# Patient Record
Sex: FEMALE | Race: WHITE | NOT HISPANIC OR LATINO | URBAN - METROPOLITAN AREA
[De-identification: names, ages, dates, MRNs, and addresses within clinical notes are randomized per-mention and may not be internally consistent; named-entity substitution may affect disease eponyms.]

---

## 2017-03-23 ENCOUNTER — IMPORTED ENCOUNTER (OUTPATIENT)
Dept: URBAN - METROPOLITAN AREA CLINIC 31 | Facility: CLINIC | Age: 52
End: 2017-03-23

## 2017-03-23 PROBLEM — D31.32: Noted: 2017-03-23

## 2017-03-23 PROCEDURE — 92014 COMPRE OPH EXAM EST PT 1/>: CPT

## 2017-03-23 PROCEDURE — 92250 FUNDUS PHOTOGRAPHY W/I&R: CPT

## 2017-03-23 PROCEDURE — 92015 DETERMINE REFRACTIVE STATE: CPT

## 2017-03-23 NOTE — PATIENT DISCUSSION
1.  Refractive error Change glasses. 2.  Nevus OS: Choroidal lesion meets the criteria specified by the COMS study for a benign lesion. Advise continued observation. Discussed in detail with the patient. The patient voiced understanding.

## 2018-04-05 ENCOUNTER — IMPORTED ENCOUNTER (OUTPATIENT)
Dept: URBAN - METROPOLITAN AREA CLINIC 31 | Facility: CLINIC | Age: 53
End: 2018-04-05

## 2018-04-05 PROBLEM — D31.32: Noted: 2018-04-05

## 2018-04-05 PROCEDURE — 92015 DETERMINE REFRACTIVE STATE: CPT

## 2018-04-05 PROCEDURE — 92014 COMPRE OPH EXAM EST PT 1/>: CPT

## 2019-08-09 NOTE — PATIENT DISCUSSION
GLAUCOMA BOTH EYES:  IOP is too high. Continue with simbrinza one drop ou bid, Timolol  0.5% one drop ou am and suppertime. Add Travatan Z one drop ou hs.

## 2019-09-13 NOTE — PATIENT DISCUSSION
on Travatan Z , timolol, and simbrinza. When rhopressa was added, the IOP came down to OD 13  OS  11.

## 2019-09-13 NOTE — PATIENT DISCUSSION
Pt is tolerating the glaucoma drops well. Continue with Travatan Z one drop ou hs, Timolol  0.5% one drop ou am and suppertime, simbrinza one drop ou bid, and rhopressa one drop ou hs.

## 2020-01-10 NOTE — PATIENT DISCUSSION
MILD GLAUCOMA BOTH EYES:  Pt's IOP is OD 10 and OS 11 on Travatan Z one drop ou hd, Timolol  0.5% one drop ou am and supper time, and simbrinza one drop ou bid. Pt has a 0.8 cup OD and a 0.9 cup OS with a superior and inferior arcuate scotoma OD and a superior arcuate scotoma OS.

## 2020-05-12 NOTE — PATIENT DISCUSSION
[facility] Visual Field 36 point screen: I have reviewed the visual fields both taped and untaped on this patient which demonstrate significant obstruction of the patient's peripheral visual field on the left eye.

## 2020-05-13 ENCOUNTER — IMPORTED ENCOUNTER (OUTPATIENT)
Dept: URBAN - METROPOLITAN AREA CLINIC 31 | Facility: CLINIC | Age: 55
End: 2020-05-13

## 2020-05-13 PROBLEM — D31.32: Noted: 2020-05-13

## 2020-05-13 PROCEDURE — 92014 COMPRE OPH EXAM EST PT 1/>: CPT

## 2020-05-13 PROCEDURE — 92250 FUNDUS PHOTOGRAPHY W/I&R: CPT

## 2020-05-13 PROCEDURE — 92015 DETERMINE REFRACTIVE STATE: CPT

## 2020-05-13 NOTE — PATIENT DISCUSSION
1.  Nevus OS: Choroidal lesion meets the criteria specified by the COMS study for a benign lesion. Advise continued observation. Discussed in detail with the patient. The patient voiced understanding. 2.  Refractive error  Rx glasses. 3.   Astigmatism Annual

## 2020-06-19 NOTE — PATIENT DISCUSSION
Also, please do not hesitate to call us if you have any concerns not addressed by this information. Please call [phone] and we will do everything we can to help you during this period.

## 2021-10-28 NOTE — PATIENT DISCUSSION
IOP at goal. Continue all 3 drops for now. Discussed Adan Turcios again today. Patient considering it. Follow up in 6 months for DFE, OCT.

## 2021-11-12 ENCOUNTER — IMPORTED ENCOUNTER (OUTPATIENT)
Dept: URBAN - METROPOLITAN AREA CLINIC 31 | Facility: CLINIC | Age: 56
End: 2021-11-12

## 2021-11-12 PROBLEM — H01.116: Noted: 2021-11-12

## 2021-11-12 PROCEDURE — 99213 OFFICE O/P EST LOW 20 MIN: CPT

## 2021-11-12 NOTE — PATIENT DISCUSSION
Contact Dermatitis OS - The left eyelids are swollen secondary to contact with an allergen. Rx FML ointment BID

## 2022-04-02 ASSESSMENT — VISUAL ACUITY
OS_SC: 20/20-2
OD_CC: J714''
OD_SC: 20/25-1
OS_CC: J514''
OS_SC: 20/30
OS_SC: 20/25-1
OD_SC: 20/25
OD_SC: 20/25-2
OS_SC: 20/30
OD_SC: 20/40
OD_PH: CC 20/25

## 2022-04-02 ASSESSMENT — TONOMETRY
OS_IOP_MMHG: 13
OS_IOP_MMHG: 17
OD_IOP_MMHG: 14
OD_IOP_MMHG: 13
OS_IOP_MMHG: 14
OD_IOP_MMHG: 16

## 2022-07-09 ENCOUNTER — TELEPHONE ENCOUNTER (OUTPATIENT)
Dept: URBAN - METROPOLITAN AREA CLINIC 121 | Facility: CLINIC | Age: 57
End: 2022-07-09

## 2022-07-10 ENCOUNTER — TELEPHONE ENCOUNTER (OUTPATIENT)
Dept: URBAN - METROPOLITAN AREA CLINIC 121 | Facility: CLINIC | Age: 57
End: 2022-07-10

## 2022-07-30 ENCOUNTER — TELEPHONE ENCOUNTER (OUTPATIENT)
Age: 57
End: 2022-07-30

## 2022-07-31 ENCOUNTER — TELEPHONE ENCOUNTER (OUTPATIENT)
Age: 57
End: 2022-07-31

## 2022-08-04 NOTE — PATIENT DISCUSSION
IOP at goal. Continue all 3 drops for now. Occasionally forgets the Travaprost, will move up the time timing to coincide with other drops.  Discussed Renaldo Cortezught again today. Patient considering it.

## 2022-12-08 NOTE — PATIENT DISCUSSION
Switching from [place] Panorama to Dorzolamide BID OU and Brimonidine BID OU due to cost. Continue Timolol BID. Continue Travoprost qHS.

## 2023-11-16 ENCOUNTER — EMERGENCY VISIT (OUTPATIENT)
Dept: URBAN - METROPOLITAN AREA CLINIC 31 | Facility: CLINIC | Age: 58
End: 2023-11-16

## 2023-11-16 DIAGNOSIS — H01.026: ICD-10-CM

## 2023-11-16 DIAGNOSIS — H01.023: ICD-10-CM

## 2023-11-16 DIAGNOSIS — H04.123: ICD-10-CM

## 2023-11-16 PROCEDURE — 99213 OFFICE O/P EST LOW 20 MIN: CPT

## 2023-11-16 ASSESSMENT — VISUAL ACUITY
OD_CC: 20/25
OS_CC: J2
OD_CC: J5
OS_CC: 20/30-1

## 2024-01-06 NOTE — PATIENT DISCUSSION
COUNSELING: You were seen and evaluated here in the emergency department today.  I advise that you follow-up with your primary care physician in the next 24 to 72 hours or the clinic listed above in your discharge paperwork as needed.

## 2024-02-05 ENCOUNTER — COMPREHENSIVE EXAM (OUTPATIENT)
Dept: URBAN - METROPOLITAN AREA CLINIC 31 | Facility: CLINIC | Age: 59
End: 2024-02-05

## 2024-02-05 DIAGNOSIS — H04.123: ICD-10-CM

## 2024-02-05 DIAGNOSIS — D31.32: ICD-10-CM

## 2024-02-05 PROCEDURE — 92014 COMPRE OPH EXAM EST PT 1/>: CPT

## 2024-02-05 PROCEDURE — 92134 CPTRZ OPH DX IMG PST SGM RTA: CPT

## 2024-02-05 ASSESSMENT — VISUAL ACUITY
OD_CC: 20/40
OS_CC: 20/25
OU_CC: 20/30
OS_CC: J2
OD_PH: 20/30
OU_CC: J2
OD_CC: J3

## 2024-02-05 ASSESSMENT — TONOMETRY
OS_IOP_MMHG: 15
OD_IOP_MMHG: 15

## 2024-02-19 ENCOUNTER — EMERGENCY VISIT (OUTPATIENT)
Dept: URBAN - METROPOLITAN AREA CLINIC 31 | Facility: CLINIC | Age: 59
End: 2024-02-19

## 2024-02-19 DIAGNOSIS — H04.123: ICD-10-CM

## 2024-02-19 PROCEDURE — 99213 OFFICE O/P EST LOW 20 MIN: CPT

## 2024-02-19 RX ORDER — PREDNISOLONE ACETATE 10 MG/ML: 1 SUSPENSION/ DROPS OPHTHALMIC

## 2024-02-19 ASSESSMENT — VISUAL ACUITY
OU_CC: 20/30
OS_CC: 20/30-1
OS_PH: 20/30
OD_CC: 20/40

## 2024-02-19 ASSESSMENT — TONOMETRY
OD_IOP_MMHG: 14
OS_IOP_MMHG: 14

## 2024-03-14 ENCOUNTER — ESTABLISHED PATIENT (OUTPATIENT)
Dept: URBAN - METROPOLITAN AREA CLINIC 31 | Facility: CLINIC | Age: 59
End: 2024-03-14

## 2024-03-14 DIAGNOSIS — H04.123: ICD-10-CM

## 2024-03-14 DIAGNOSIS — J30.0: ICD-10-CM

## 2024-03-14 PROCEDURE — 99213 OFFICE O/P EST LOW 20 MIN: CPT

## 2024-03-14 ASSESSMENT — TONOMETRY: OS_IOP_MMHG: 13

## 2024-03-14 ASSESSMENT — VISUAL ACUITY
OD_CC: 20/30-2
OS_CC: 20/40-2

## 2024-03-28 ENCOUNTER — ESTABLISHED PATIENT (OUTPATIENT)
Dept: URBAN - METROPOLITAN AREA CLINIC 31 | Facility: CLINIC | Age: 59
End: 2024-03-28

## 2024-03-28 DIAGNOSIS — H01.116: ICD-10-CM

## 2024-03-28 DIAGNOSIS — H04.123: ICD-10-CM

## 2024-03-28 DIAGNOSIS — J30.0: ICD-10-CM

## 2024-03-28 PROCEDURE — 99213 OFFICE O/P EST LOW 20 MIN: CPT

## 2024-03-28 RX ORDER — TOBRAMYCIN AND DEXAMETHASONE 3; 1 MG/G; MG/G: 1/2 OINTMENT OPHTHALMIC EVERY EVENING

## 2024-03-28 ASSESSMENT — VISUAL ACUITY
OD_CC: 20/40
OS_PH: 20/40
OD_PH: 20/30
OS_CC: 20/50

## 2024-03-28 ASSESSMENT — TONOMETRY
OD_IOP_MMHG: 14
OS_IOP_MMHG: 14

## 2025-02-19 ENCOUNTER — EMERGENCY VISIT (OUTPATIENT)
Age: 60
End: 2025-02-19

## 2025-02-19 DIAGNOSIS — J30.0: ICD-10-CM

## 2025-02-19 DIAGNOSIS — H04.123: ICD-10-CM

## 2025-02-19 DIAGNOSIS — H01.116: ICD-10-CM

## 2025-02-19 PROCEDURE — 99213 OFFICE O/P EST LOW 20 MIN: CPT

## 2025-02-19 RX ORDER — PREDNISOLONE ACETATE 10 MG/ML: 1 SUSPENSION/ DROPS OPHTHALMIC

## 2025-02-19 RX ORDER — TOBRAMYCIN AND DEXAMETHASONE 3; 1 MG/G; MG/G: 1/2 OINTMENT OPHTHALMIC EVERY EVENING

## 2025-04-01 ENCOUNTER — FOLLOW UP (OUTPATIENT)
Age: 60
End: 2025-04-01

## 2025-04-01 DIAGNOSIS — H04.123: ICD-10-CM

## 2025-04-01 DIAGNOSIS — H10.33: ICD-10-CM

## 2025-04-01 PROCEDURE — 99213 OFFICE O/P EST LOW 20 MIN: CPT

## 2025-04-01 RX ORDER — TOBRAMYCIN AND DEXAMETHASONE 1; 3 MG/ML; MG/ML: 1 SUSPENSION/ DROPS OPHTHALMIC EVERY EVENING

## 2025-04-01 RX ORDER — TOBRAMYCIN AND DEXAMETHASONE 1; 3 MG/ML; MG/ML: 1 SUSPENSION/ DROPS OPHTHALMIC

## 2025-04-25 ENCOUNTER — PREPPED CHART (OUTPATIENT)
Age: 60
End: 2025-04-25

## 2025-05-07 ENCOUNTER — FOLLOW UP (OUTPATIENT)
Age: 60
End: 2025-05-07

## 2025-05-07 DIAGNOSIS — H10.503: ICD-10-CM

## 2025-05-07 DIAGNOSIS — H04.123: ICD-10-CM

## 2025-05-07 PROCEDURE — 99213 OFFICE O/P EST LOW 20 MIN: CPT

## 2025-06-25 ENCOUNTER — FOLLOW UP (OUTPATIENT)
Age: 60
End: 2025-06-25

## 2025-06-25 DIAGNOSIS — H04.123: ICD-10-CM

## 2025-06-25 DIAGNOSIS — H10.12: ICD-10-CM

## 2025-06-25 PROCEDURE — 99213 OFFICE O/P EST LOW 20 MIN: CPT

## 2025-06-25 RX ORDER — PREDNISOLONE ACETATE 10 MG/ML: 1 SUSPENSION/ DROPS OPHTHALMIC
